# Patient Record
Sex: FEMALE
[De-identification: names, ages, dates, MRNs, and addresses within clinical notes are randomized per-mention and may not be internally consistent; named-entity substitution may affect disease eponyms.]

---

## 2022-08-09 ENCOUNTER — APPOINTMENT (OUTPATIENT)
Dept: ORTHOPEDIC SURGERY | Facility: CLINIC | Age: 72
End: 2022-08-09

## 2022-08-09 VITALS — WEIGHT: 109 LBS | HEIGHT: 60 IN | BODY MASS INDEX: 21.4 KG/M2

## 2022-08-09 DIAGNOSIS — Z86.39 PERSONAL HISTORY OF OTHER ENDOCRINE, NUTRITIONAL AND METABOLIC DISEASE: ICD-10-CM

## 2022-08-09 DIAGNOSIS — Z78.9 OTHER SPECIFIED HEALTH STATUS: ICD-10-CM

## 2022-08-09 DIAGNOSIS — M81.0 AGE-RELATED OSTEOPOROSIS W/OUT CURRENT PATHOLOGICAL FRACTURE: ICD-10-CM

## 2022-08-09 DIAGNOSIS — M17.12 UNILATERAL PRIMARY OSTEOARTHRITIS, LEFT KNEE: ICD-10-CM

## 2022-08-09 PROBLEM — Z00.00 ENCOUNTER FOR PREVENTIVE HEALTH EXAMINATION: Status: ACTIVE | Noted: 2022-08-09

## 2022-08-09 PROCEDURE — 99203 OFFICE O/P NEW LOW 30 MIN: CPT

## 2022-08-09 PROCEDURE — 73564 X-RAY EXAM KNEE 4 OR MORE: CPT | Mod: LT

## 2022-08-09 NOTE — PHYSICAL EXAM
[] : patient ambulates without assistive device [Left] : left knee [AP] : anteroposterior [Lateral] : lateral [[place]] : skyline [AP Standing] : anteroposterior standing [Degenerative change] : Degenerative change [FreeTextEntry9] : ossicle tibial tubercle

## 2022-08-09 NOTE — HISTORY OF PRESENT ILLNESS
[0] : 0 [de-identified] : 08/09/2022 Ms. ANNMARIE FRANCO,  72 year old  female , presents today for left knee. Reports anterior left knee pain and stiffness, aggravated with sustained sitting. Reports that she noticed a "bump" over the anterior aspect of the knee a few years ago which only recently became painful.  \par \par Retired [] : no [FreeTextEntry1] : lt knee  [FreeTextEntry5] :  ANNMARIE FRANCO is a 72 year female who is here today for lt knee pain. She states she has a bump on the knee which appeared about 3 years ago and hurts when she gets up after sitting for a while. The pain is a sharp pain and is an 8.

## 2022-08-09 NOTE — DISCUSSION/SUMMARY
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 \par 2) rtc prn\par \par Entered by Sofia Lomeli acting as scribe.\par

## 2022-08-10 PROBLEM — Z78.9 SOCIAL ALCOHOL USE: Status: ACTIVE | Noted: 2022-08-09

## 2022-08-10 PROBLEM — Z78.9 NON-SMOKER: Status: ACTIVE | Noted: 2022-08-09

## 2022-08-10 PROBLEM — Z86.39 HISTORY OF HYPOPARATHYROIDISM: Status: RESOLVED | Noted: 2022-08-09 | Resolved: 2022-08-10

## 2022-08-23 ENCOUNTER — APPOINTMENT (OUTPATIENT)
Dept: ORTHOPEDIC SURGERY | Facility: CLINIC | Age: 72
End: 2022-08-23

## 2022-08-23 VITALS — WEIGHT: 109 LBS | BODY MASS INDEX: 20.58 KG/M2 | HEIGHT: 61 IN

## 2022-08-23 DIAGNOSIS — M18.11 UNILATERAL PRIMARY OSTEOARTHRITIS OF FIRST CARPOMETACARPAL JOINT, RIGHT HAND: ICD-10-CM

## 2022-08-23 DIAGNOSIS — M65.352 TRIGGER FINGER, LEFT LITTLE FINGER: ICD-10-CM

## 2022-08-23 PROCEDURE — 99203 OFFICE O/P NEW LOW 30 MIN: CPT

## 2022-08-23 PROCEDURE — 73110 X-RAY EXAM OF WRIST: CPT | Mod: RT

## 2022-08-23 NOTE — PHYSICAL EXAM
[1st] : 1st [CMC Joint] : CMC joint [Left] : left hand [5th] : 5th [A1-Pulley] : A1-pulley [Right] : right wrist [Degenerative change] : Degenerative change [] : negative triggering [de-identified] : No triggering  [FreeTextEntry8] : stage 3 first CMC arthritis

## 2022-08-23 NOTE — HISTORY OF PRESENT ILLNESS
[6] : 6 [4] : 4 [Dull/Aching] : dull/aching [Meds] : meds [Ice] : ice [de-identified] : 72 year old presenting with intermittent LEFT middle finger locking. Also with raidal side right hand and wrist pain.  [] : no [FreeTextEntry1] : hands [FreeTextEntry5] : H/O right hand dog bite 4 years ago. now developing pain in thumb\par left middle finger clicking for last few months ago\par denies N/T [de-identified] : activity